# Patient Record
Sex: FEMALE | Race: ASIAN | URBAN - METROPOLITAN AREA
[De-identification: names, ages, dates, MRNs, and addresses within clinical notes are randomized per-mention and may not be internally consistent; named-entity substitution may affect disease eponyms.]

---

## 2017-10-31 ENCOUNTER — OFFICE VISIT (OUTPATIENT)
Dept: FAMILY MEDICINE | Facility: CLINIC | Age: 32
End: 2017-10-31

## 2017-10-31 VITALS
BODY MASS INDEX: 26.7 KG/M2 | HEART RATE: 88 BPM | OXYGEN SATURATION: 100 % | HEIGHT: 58 IN | SYSTOLIC BLOOD PRESSURE: 108 MMHG | TEMPERATURE: 97.8 F | DIASTOLIC BLOOD PRESSURE: 77 MMHG | RESPIRATION RATE: 20 BRPM | WEIGHT: 127.2 LBS

## 2017-10-31 DIAGNOSIS — K21.9 GASTROESOPHAGEAL REFLUX DISEASE WITHOUT ESOPHAGITIS: Primary | ICD-10-CM

## 2017-10-31 DIAGNOSIS — F43.9 STRESS: ICD-10-CM

## 2017-10-31 DIAGNOSIS — E66.3 OVERWEIGHT (BMI 25.0-29.9): ICD-10-CM

## 2017-10-31 DIAGNOSIS — Z23 IMMUNIZATION DUE: ICD-10-CM

## 2017-10-31 RX ORDER — HYDROCODONE BITARTRATE AND ACETAMINOPHEN 5; 325 MG/1; MG/1
1 TABLET ORAL
COMMUNITY
Start: 2017-10-29 | End: 2017-11-09

## 2017-10-31 RX ORDER — SUCRALFATE ORAL 1 G/10ML
1 SUSPENSION ORAL
Qty: 1200 ML | Refills: 1 | Status: SHIPPED | OUTPATIENT
Start: 2017-10-31 | End: 2017-11-09

## 2017-10-31 RX ORDER — SUCRALFATE ORAL 1 G/10ML
1 SUSPENSION ORAL
COMMUNITY
Start: 2017-10-29 | End: 2017-10-31

## 2017-10-31 RX ORDER — FAMOTIDINE 20 MG/1
20 TABLET, FILM COATED ORAL DAILY
Qty: 31 TABLET | Refills: 3 | Status: SHIPPED | OUTPATIENT
Start: 2017-10-31 | End: 2017-11-14

## 2017-10-31 RX ORDER — FAMOTIDINE 20 MG/1
20 TABLET, FILM COATED ORAL DAILY
COMMUNITY
Start: 2017-10-29 | End: 2017-10-31

## 2017-10-31 NOTE — MR AVS SNAPSHOT
After Visit Summary   10/31/2017    Tyra Phillips    MRN: [id number]           Patient Information     Date Of Birth          1985        Visit Information        Provider Department      10/31/2017 10:00 AM Russell Maddox MD [facility]        Today's Diagnoses     Gastroesophageal reflux disease without esophagitis    -  1    Stress        Immunization due          Care Instructions    - Please follow-up on 11/14/17 for a medication follow-up at 9:40am with Dr. Maddox  - Continue taking all medications          Your medication list is printed, please keep this with you, it is helpful to bring this current list to any other medical appointments, the emergency room or hospital.    If you had lab testing today and your results are reassuring or normal they will be be mailed to you within 7 days.     If the lab tests need quick action we will call you with the results.   The phone number we will call with results is # [phone] (home) . If this is not the best number please call our clinic and change the number.    If you need any refills please call your pharmacy and they will contact us.    If you have any further concerns or wish to schedule another appointment you must call our office during normal business hours  [phone] (8-5:00 M-F)  If you have urgent medical questions that cannot wait  you may also call [phone] at any time of day.  If you have a medical emergency please call [phone].    Thank you for coming to [facility].            Follow-ups after your visit        Your next 10 appointments already scheduled     Nov 14, 2017  9:40 AM CST   New Patient Visit with Russell Maddox MD   [facility] ([facility])    [address] [place] [address]   [phone]              Who to contact     Please call your clinic at [phone] to:    Ask questions about your health    Make or cancel appointments    Discuss your medicines    Learn  "about your test results    Speak to your doctor   If you have compliments or concerns about an experience at your clinic, or if you wish to file a complaint, please contact [facility] Patient Relations at [phone] or email us at [email or web address]         Additional Information About Your Visit        [employer] Information     [employer] is an electronic gateway that provides easy, online access to your medical records. With [employer], you can request a clinic appointment, read your test results, renew a prescription or communicate with your care team.     To sign up for [employer] visit the website at [email or web address]   You will be asked to enter the access code listed below, as well as some personal information. Please follow the directions to create your username and password.     Your access code is: [email or web address]  Expires: 2018  9:54 AM     Your access code will  in 90 days. If you need help or a new code, please contact your [facility] Clinic or call [phone] for assistance.        Care EveryWhere ID     This is your Care EveryWhere ID. This could be used by other organizations to access your [place] medical records  [id number]        Your Vitals Were     Pulse Temperature Respirations Height Pulse Oximetry BMI (Body Mass Index)    88 97.8  F (36.6  C) (Oral) 20 4' 9.75\" (146.7 cm) 100% 26.82 kg/m2       Blood Pressure from Last 3 Encounters:   10/31/17 108/77    Weight from Last 3 Encounters:   10/31/17 127 lb 3.2 oz (57.7 kg)              We Performed the Following     ADMIN VACCINE, INITIAL     TDAP VACCINE (BOOSTRIX)          Where to get your medicines      These medications were sent to [facility] [address] [place] [address]), [place] - [address]  [address] [place] [address] ([place]) [place] [address]     Phone:  [phone]     famotidine 20 MG tablet    sucralfate 1 GM/10ML suspension          " Primary Care Provider Office Phone # Fax #    Russell Maddox [phone]       [facility] [address] [place] [address]        Equal Access to Services     BERT EVANS : Hadjuhi segovia ku hadtayloro Soomaali, waaxda luqadaha, qaybta kaalmada adeegyada, cheli rizzon rolf rodriguez laSolsayra erickson. So [facility] [phone].    ATENCIÓN: Si habla español, tiene a mike disposición servicios gratuitos de asistencia lingüística. Llame al [phone].    We comply with applicable federal civil rights laws and [place] laws. We do not discriminate on the basis of race, color, national origin, age, disability, sex, sexual orientation, or gender identity.            Thank you!     Thank you for choosing [facility]  for your care. Our goal is always to provide you with excellent care. Hearing back from our patients is one way we can continue to improve our services. Please take a few minutes to complete the written survey that you may receive in the mail after your visit with us. Thank you!             Your Updated Medication List - Protect others around you: Learn how to safely use, store and throw away your medicines at www.disposemymeds.org.          This list is accurate as of: 10/31/17 10:20 AM.  Always use your most recent med list.                   Brand Name Dispense Instructions for use Diagnosis    famotidine 20 MG tablet    PEPCID    31 tablet    Take 1 tablet (20 mg) by mouth daily    Gastroesophageal reflux disease without esophagitis       HYDROcodone-acetaminophen 5-325 MG per tablet    NORCO     Take 1 tablet by mouth 2 times daily        sucralfate 1 GM/10ML suspension    CARAFATE    1200 mL    Take 10 mLs (1 g) by mouth 4 times daily (before meals and nightly)    Gastroesophageal reflux disease without esophagitis

## 2017-10-31 NOTE — PROGRESS NOTES
"Chief Complaint   Patient presents with     ER F/U     Pt. was seen at [facility] for  chest pain.  Pt feels a bit better but a little pain still present.      Establish Care     Patient reported medication     Taking famotdine 20 mg, hydrocodone and carafate.      /77  Pulse 88  Temp 97.8  F (36.6  C) (Oral)  Resp 20  Ht 4' 9.75\" (146.7 cm)  Wt 127 lb 3.2 oz (57.7 kg)  SpO2 100%  BMI 26.82 kg/m2     Family History   Problem Relation Age of Onset     DIABETES No family hx of      Coronary Artery Disease No family hx of      Hypertension No family hx of      Breast Cancer No family hx of      Colon Cancer No family hx of      Prostate Cancer No family hx of      Other Cancer No family hx of      Depression No family hx of      Asthma No family hx of      Past Medical History:   Diagnosis Date     NO ACTIVE PROBLEMS      Past Surgical History:   Procedure Laterality Date     HYSTERECTOMY           SUBJECTIVE:  This is a 32-year-old woman in for first clinic visit after being evaluated twice in the in the emergency room for chest discomfort.  The assessment in the emergency room after reviewing the charts with his acid peptic disease or reflux.  She was placed on Carafate and famotidine which has helped her symptoms have improved and her comfort.  She has had used hydrocodone a couple of  times.   She had moved here from [facility] and her her mother, father, brothers and sisters are all there.  She is the oldest of 8 brothers and 4 sisters.  Her mother was 13 when she had Tyra who is the oldest of the 12 children.   Her parents are leaving for [place] for a month after  and will be back at the end of December.  She will be going to [facility] with her children to take care of her younger brothers and sisters.  Her youngest brother is 3 years old.   She says the abdominal/chest pain comes on when she has to discipline her children.  Sometimes it comes on when she is cooking.  It " does not appear to be related to exercise.  She does have a feeling that food gets stuck when she swallows and this seems to trigger the pain at times also.  She does not relate it to lying down or sitting up.  She does feel short of breath when this happens to her.  It does not go into her arm, neck or jaw.  It does not radiate to the legs.   She denies depression but does admit to fatigue and lack of lack of energy.   OBJECTIVE:   APPEARANCE:  Normal affect, no acute distress.   LUNGS:  Clear.  There are.   NECK:  No jugular venous.   CARDIOVASCULAR:  Distention no S3, S4 murmurs.   ABDOMEN:  Soft, mildly.   EXTREMITIES:  Moves extremities well.   ASSESSMENT:  1.  Acid peptic disease probably reflux.     2.  Family stress with moving.     3.  Overweight.   PLAN:  We discussed her medications.  Will continue with the medications listed and I refilled them for the coming month.  She will need to get a longer supply to get her through her stay in [place].  Emphasized that hydrocodone is not good therapy for this and she will just use what she has sparingly.   We did not address her overweight status today.  Will talk about diet, exercise at her next visit.   The patient involved in medical decision making throughout the visit.   Recheck in 3-4 weeks before she leaves for [place].

## 2017-10-31 NOTE — PATIENT INSTRUCTIONS
- Please follow-up on 11/14/17 for a medication follow-up at 9:40am with Dr. Maddox  - Continue taking all medications          Your medication list is printed, please keep this with you, it is helpful to bring this current list to any other medical appointments, the emergency room or hospital.    If you had lab testing today and your results are reassuring or normal they will be be mailed to you within 7 days.     If the lab tests need quick action we will call you with the results.   The phone number we will call with results is # [phone] (home) . If this is not the best number please call our clinic and change the number.    If you need any refills please call your pharmacy and they will contact us.    If you have any further concerns or wish to schedule another appointment you must call our office during normal business hours  [phone] (8-5:00 M-F)  If you have urgent medical questions that cannot wait  you may also call [phone] at any time of day.  If you have a medical emergency please call [phone].    Thank you for coming to [facility].

## 2017-11-07 ENCOUNTER — TELEPHONE (OUTPATIENT)
Dept: FAMILY MEDICINE | Facility: CLINIC | Age: 32
End: 2017-11-07

## 2017-11-07 DIAGNOSIS — K21.9 GASTROESOPHAGEAL REFLUX DISEASE WITHOUT ESOPHAGITIS: Primary | ICD-10-CM

## 2017-11-07 NOTE — TELEPHONE ENCOUNTER
[facility] phone call message- medication clarification/question:    Full Medication Name: sucralfate (CARAFATE) 1 GM/10ML suspension    Question: Pharmacist Love called to check with Dr. Maddox if Dr. Maddox can check and find an alternative prescription to [facility] for this medication due to health ins won't cover.     Pharmacy confirmed as [facility] [address] [place] [address] WEST: Yes    OK to leave a message on voice mail? Yes    Primary language: English      needed? No    Call taken on November 7, 2017 at 11:48 AM by Jenny Smith

## 2017-11-09 ENCOUNTER — TELEPHONE (OUTPATIENT)
Dept: FAMILY MEDICINE | Facility: CLINIC | Age: 32
End: 2017-11-09

## 2017-11-09 RX ORDER — SUCRALFATE 1 G/1
1 TABLET ORAL 4 TIMES DAILY
Qty: 120 TABLET | Refills: 1 | Status: SHIPPED | OUTPATIENT
Start: 2017-11-09

## 2017-11-09 NOTE — TELEPHONE ENCOUNTER
[facility] phone call message- medication clarification/question:    Full Medication Name: HYDROcodone-acetaminophen (NORCO) 5-325 MG per tablet    Question: Patient called stating that health ins does not cover this prescription and if Dr. Maddox can look into it and see what else he can do to sub this one.      Pharmacy confirmed as [facility] [address] [place] [address] WEST: Yes    OK to leave a message on voice mail? Yes    Primary language: English      needed? No    Call taken on November 9, 2017 at 1:49 PM by Jenny Smith

## 2017-11-09 NOTE — TELEPHONE ENCOUNTER
Her GI problem would potentially be made worse by ibuprofen and aleve.  Narcotics are not good drugs for this type of pain.  Could try tums along with the tylenol.  WR

## 2017-11-09 NOTE — TELEPHONE ENCOUNTER
Spoke with Juan Miguel Arreaga, tablet carafate rx was received by pharmacy, ran through and is covered. Pharmacy to notify ptChristie Cohen RN

## 2017-11-09 NOTE — TELEPHONE ENCOUNTER
Called and informed patient of this. Patient states that for the past 2 weeks she has been taking the regular tylenol - 2 tabs BID and has not helped with pain. Patient is wondering if she can get something else other than tylenol that will be cover by her insurance so she can take along with her to [place]. She will be there until January 2018. Informed patient will forward message to provider.

## 2017-11-14 ENCOUNTER — OFFICE VISIT (OUTPATIENT)
Dept: FAMILY MEDICINE | Facility: CLINIC | Age: 32
End: 2017-11-14

## 2017-11-14 VITALS
DIASTOLIC BLOOD PRESSURE: 80 MMHG | HEIGHT: 58 IN | HEART RATE: 94 BPM | TEMPERATURE: 97.9 F | SYSTOLIC BLOOD PRESSURE: 114 MMHG | RESPIRATION RATE: 20 BRPM | OXYGEN SATURATION: 100 % | WEIGHT: 127.4 LBS | BODY MASS INDEX: 26.74 KG/M2

## 2017-11-14 DIAGNOSIS — K21.9 GASTROESOPHAGEAL REFLUX DISEASE WITHOUT ESOPHAGITIS: ICD-10-CM

## 2017-11-14 RX ORDER — FAMOTIDINE 20 MG/1
20 TABLET, FILM COATED ORAL DAILY
Qty: 31 TABLET | Refills: 3 | Status: SHIPPED | OUTPATIENT
Start: 2017-11-14

## 2017-11-14 NOTE — PATIENT INSTRUCTIONS
- Please follow-up with us when you come back from out of town          Your medication list is printed, please keep this with you, it is helpful to bring this current list to any other medical appointments, the emergency room or hospital.    If you had lab testing today and your results are reassuring or normal they will be be mailed to you within 7 days.     If the lab tests need quick action we will call you with the results.   The phone number we will call with results is # [phone] (home) . If this is not the best number please call our clinic and change the number.    If you need any refills please call your pharmacy and they will contact us.    If you have any further concerns or wish to schedule another appointment you must call our office during normal business hours  [phone] (8-5:00 M-F)  If you have urgent medical questions that cannot wait  you may also call [phone] at any time of day.  If you have a medical emergency please call [phone].    Thank you for coming to [facility].

## 2017-11-14 NOTE — PROGRESS NOTES
"Chief Complaint   Patient presents with     Recheck Medication     No other concerns.      Medication Reconciliation     Not completed, patient did not bring medications and doesn't know the names.      /80  Pulse 94  Temp 97.9  F (36.6  C) (Oral)  Resp 20  Ht 4' 10\" (147.3 cm)  Wt 127 lb 6.4 oz (57.8 kg)  SpO2 100%  BMI 26.63 kg/m2    SUBJECTIVE:  This is a 32-year-old woman in for recheck of her abdominal discomfort.  She has been taking the Pepcid and that seems to have helped her pain and is essentially gone.  She never did take the Carafate because the pills were too big.  She is leaving for [place] next week to take care of her brothers and sisters while her parents are traveling in [facility].  She will return about a month later.   OBJECTIVE:   APPEARANCE:  No acute distress, appears healthy.   HEENT:  Clear.   NECK:  Without adenopathy.   LUNGS:  Clear.   CARDIOVASCULAR:  Regular rhythm.   ABDOMEN:  Soft, nontender.   EXTREMITIES:  No edema.   ASSESSMENT:     1.  Reflux disease, improved.   PLAN:  She should continue the Pepcid for another month while she was helping with her siblings and then can discontinue when she returns home.  She has not used any of her Carafate, so she can take that long in case the pain resumes.  She could try dissolving it in a spoon and taking as a slurry rather than a large pill.   We discussed continued walking and physical activity.   The patient will return as needed.       "

## 2017-11-14 NOTE — MR AVS SNAPSHOT
After Visit Summary   11/14/2017    Tyra Phillips    MRN: [id number]           Patient Information     Date Of Birth          1985        Visit Information        Provider Department      11/14/2017 9:40 AM Russell Maddox MD [facility]        Today's Diagnoses     Gastroesophageal reflux disease without esophagitis          Care Instructions    - Please follow-up with us when you come back from out of town          Your medication list is printed, please keep this with you, it is helpful to bring this current list to any other medical appointments, the emergency room or hospital.    If you had lab testing today and your results are reassuring or normal they will be be mailed to you within 7 days.     If the lab tests need quick action we will call you with the results.   The phone number we will call with results is # [phone] (home) . If this is not the best number please call our clinic and change the number.    If you need any refills please call your pharmacy and they will contact us.    If you have any further concerns or wish to schedule another appointment you must call our office during normal business hours  [phone] (8-5:00 M-F)  If you have urgent medical questions that cannot wait  you may also call [phone] at any time of day.  If you have a medical emergency please call [phone].    Thank you for coming to [facility].            Follow-ups after your visit        Who to contact     Please call your clinic at [phone] to:    Ask questions about your health    Make or cancel appointments    Discuss your medicines    Learn about your test results    Speak to your doctor   If you have compliments or concerns about an experience at your clinic, or if you wish to file a complaint, please contact [facility] Patient Relations at [phone] or email us at [email or web address]         Additional Information About Your  "Visit        [employer] Information     [employer] is an electronic gateway that provides easy, online access to your medical records. With [employer], you can request a clinic appointment, read your test results, renew a prescription or communicate with your care team.     To sign up for [employer] visit the website at [email or web address]   You will be asked to enter the access code listed below, as well as some personal information. Please follow the directions to create your username and password.     Your access code is: [email or web address]  Expires: 2018  8:54 AM     Your access code will  in 90 days. If you need help or a new code, please contact your [facility] or call [phone] for assistance.        Care EveryWhere ID     This is your Care EveryWhere ID. This could be used by other organizations to access your [place] medical records  [id number]        Your Vitals Were     Pulse Temperature Respirations Height Pulse Oximetry BMI (Body Mass Index)    94 97.9  F (36.6  C) (Oral) 20 4' 10\" (147.3 cm) 100% 26.63 kg/m2       Blood Pressure from Last 3 Encounters:   17 114/80   10/31/17 108/77    Weight from Last 3 Encounters:   17 127 lb 6.4 oz (57.8 kg)   10/31/17 127 lb 3.2 oz (57.7 kg)              Today, you had the following     No orders found for display         Where to get your medicines      These medications were sent to [address], [address]) [place] [address]     Phone:  [phone]     famotidine 20 MG tablet          Primary Care Provider Office Phone # Fax #    Russell Maddox [phone]       [facility] [address] [place] [address]        Equal Access to Services     BERT EVANS AH: Hadii aad ku hadasho Soomaali, waaxda luqadaha, qaybta kaalmada adeegyada, waxay kwame bernard ah. So [facility] " [phone].    ATENCIÓN: Si urbano anthony, tiene a mike disposición servicios gratuitos de asistencia lingüística. Cristobal knight [phone].    We comply with applicable federal civil rights laws and [place] laws. We do not discriminate on the basis of race, color, national origin, age, disability, sex, sexual orientation, or gender identity.            Thank you!     Thank you for choosing [facility]  for your care. Our goal is always to provide you with excellent care. Hearing back from our patients is one way we can continue to improve our services. Please take a few minutes to complete the written survey that you may receive in the mail after your visit with us. Thank you!             Your Updated Medication List - Protect others around you: Learn how to safely use, store and throw away your medicines at www.disposemymeds.org.          This list is accurate as of: 11/14/17 10:06 AM.  Always use your most recent med list.                   Brand Name Dispense Instructions for use Diagnosis    famotidine 20 MG tablet    PEPCID    31 tablet    Take 1 tablet (20 mg) by mouth daily    Gastroesophageal reflux disease without esophagitis       sucralfate 1 GM tablet    CARAFATE    120 tablet    Take 1 tablet (1 g) by mouth 4 times daily    Gastroesophageal reflux disease without esophagitis

## 2018-01-28 ENCOUNTER — HEALTH MAINTENANCE LETTER (OUTPATIENT)
Age: 33
End: 2018-01-28

## 2018-10-04 ENCOUNTER — OFFICE VISIT (OUTPATIENT)
Dept: FAMILY MEDICINE | Facility: CLINIC | Age: 33
End: 2018-10-04
Payer: MEDICAID

## 2018-10-04 VITALS
SYSTOLIC BLOOD PRESSURE: 123 MMHG | HEIGHT: 57 IN | RESPIRATION RATE: 18 BRPM | TEMPERATURE: 98.7 F | OXYGEN SATURATION: 98 % | DIASTOLIC BLOOD PRESSURE: 86 MMHG | HEART RATE: 99 BPM | BODY MASS INDEX: 30.24 KG/M2 | WEIGHT: 140.2 LBS

## 2018-10-04 DIAGNOSIS — J06.9 VIRAL URI WITH COUGH: Primary | ICD-10-CM

## 2018-10-04 NOTE — PROGRESS NOTES
"Chief Complaint   Patient presents with     URI     x1 week     Cough     Usig cough drop, taking robotussin     Fever     Medication Reconciliation     Nasal Congestion     Pharyngitis     /86  Pulse 99  Temp 98.7  F (37.1  C) (Oral)  Resp 18  Ht 4' 9.48\" (146 cm)  Wt 140 lb 3.2 oz (63.6 kg)  SpO2 98%  BMI 29.83 kg/m2    SUBJECTIVE:  This is a 33-year-old woman in for check of cough and raspy voice.  She got sick about 6 days ago and has gotten worse.  She is coughing a lot at night and she has lost her voice.  She has a hard time talking.  She has been taking cough drops and Robitussin and neither seem to help her.  She has had fever over the past few days.  She does complain of sore throat.  She has not had earache, nausea, vomiting or diarrhea.   One of her kids has similar symptoms.   OBJECTIVE:   APPEARANCE:  Overweight, no acute distress.  She is coughing and has a raspy voice.   HEENT:  TMs are clear.  Throat clear.   NECK:  No adenopathy.  Voice is raspy.   LUNGS:  Clear.   SKIN:  No rash.   ASSESSMENT:     1.  Viral infection with cough.   PLAN:  May continue to use Robitussin and cough drops.  I have suggested that she try a teaspoon of honey on a periodic basis throughout the day to see if that will help relieve the cough.  She should keep her fluids up to keep well hydrated and if she gets worse or is not resolved over the next week to 10 days we should recheck.   The patient and  involved in medical decision making throughout the visit.       "

## 2018-10-04 NOTE — MR AVS SNAPSHOT
"              After Visit Summary   10/4/2018    Tyra Phillips    MRN: [id number]           Patient Information     Date Of Birth          1985        Visit Information        Provider Department      10/4/2018 10:40 AM Russell Maddox MD [facility]        Care Instructions    You have an upper respiratory illness that is caused by a virus.  A teaspoon of honey every few hours may help.  Any honey from the grocery store is ok but a local honey may be best.  You can use tylenol or ibuprofen for relief.  It usually last 1-2 weeks.  Keep up your fluid intake.  Recheck if you are not better in a week.  Flu shot when well.    WR          Follow-ups after your visit        Follow-up notes from your care team     Return if symptoms worsen or fail to improve.      Who to contact     Please call your clinic at [phone] to:    Ask questions about your health    Make or cancel appointments    Discuss your medicines    Learn about your test results    Speak to your doctor            Additional Information About Your Visit        Care EveryWhere ID     This is your Care EveryWhere ID. This could be used by other organizations to access your [place] medical records  [id number]        Your Vitals Were     Pulse Temperature Respirations Height Pulse Oximetry BMI (Body Mass Index)    99 98.7  F (37.1  C) (Oral) 18 4' 9.48\" (146 cm) 98% 29.83 kg/m2       Blood Pressure from Last 3 Encounters:   10/04/18 123/86   11/14/17 114/80   10/31/17 108/77    Weight from Last 3 Encounters:   10/04/18 140 lb 3.2 oz (63.6 kg)   11/14/17 127 lb 6.4 oz (57.8 kg)   10/31/17 127 lb 3.2 oz (57.7 kg)              Today, you had the following     No orders found for display       Primary Care Provider Office Phone # Fax #    Russell Maddox [phone]       [facility] [address]        Equal Access to Services     BERT EVANS AH: Kwaku Mccormack, guero brown, qaybta " cheli hendersonandre erickson. So [facility] [phone].    ATENCIÓN: Si habla gabrielle, tiene a mike disposición servicios gratuitos de asistencia lingüística. Cristobal al [phone].    We comply with applicable federal civil rights laws and [place] laws. We do not discriminate on the basis of race, color, national origin, age, disability, sex, sexual orientation, or gender identity.            Thank you!     Thank you for choosing [facility]  for your care. Our goal is always to provide you with excellent care. Hearing back from our patients is one way we can continue to improve our services. Please take a few minutes to complete the written survey that you may receive in the mail after your visit with us. Thank you!             Your Updated Medication List - Protect others around you: Learn how to safely use, store and throw away your medicines at www.disposemymeds.org.          This list is accurate as of 10/4/18 11:03 AM.  Always use your most recent med list.                   Brand Name Dispense Instructions for use Diagnosis    famotidine 20 MG tablet    PEPCID    31 tablet    Take 1 tablet (20 mg) by mouth daily    Gastroesophageal reflux disease without esophagitis       sucralfate 1 GM tablet    CARAFATE    120 tablet    Take 1 tablet (1 g) by mouth 4 times daily    Gastroesophageal reflux disease without esophagitis

## 2018-10-04 NOTE — PATIENT INSTRUCTIONS
You have an upper respiratory illness that is caused by a virus.  A teaspoon of honey every few hours may help.  Any honey from the grocery store is ok but a local honey may be best.  You can use tylenol or ibuprofen for relief.  It usually last 1-2 weeks.  Keep up your fluid intake.  Recheck if you are not better in a week.  Flu shot when well.    WR